# Patient Record
Sex: FEMALE | Race: WHITE | NOT HISPANIC OR LATINO | Employment: OTHER | URBAN - METROPOLITAN AREA
[De-identification: names, ages, dates, MRNs, and addresses within clinical notes are randomized per-mention and may not be internally consistent; named-entity substitution may affect disease eponyms.]

---

## 2023-08-03 ENCOUNTER — PATIENT MESSAGE (OUTPATIENT)
Dept: RESEARCH | Facility: HOSPITAL | Age: 73
End: 2023-08-03

## 2023-08-12 ENCOUNTER — OFFICE VISIT (OUTPATIENT)
Dept: URGENT CARE | Facility: CLINIC | Age: 73
End: 2023-08-12
Payer: MEDICARE

## 2023-08-12 VITALS
WEIGHT: 185 LBS | HEIGHT: 64 IN | HEART RATE: 70 BPM | RESPIRATION RATE: 18 BRPM | SYSTOLIC BLOOD PRESSURE: 136 MMHG | DIASTOLIC BLOOD PRESSURE: 60 MMHG | TEMPERATURE: 100 F | OXYGEN SATURATION: 95 % | BODY MASS INDEX: 31.58 KG/M2

## 2023-08-12 DIAGNOSIS — R05.9 COUGH WITH FEVER: Primary | ICD-10-CM

## 2023-08-12 DIAGNOSIS — U07.1 COVID-19 VIRUS DETECTED: ICD-10-CM

## 2023-08-12 DIAGNOSIS — R50.9 COUGH WITH FEVER: Primary | ICD-10-CM

## 2023-08-12 DIAGNOSIS — U07.1 SARS-COV-2 POSITIVE: ICD-10-CM

## 2023-08-12 LAB
CTP QC/QA: YES
SARS-COV-2 AG RESP QL IA.RAPID: POSITIVE

## 2023-08-12 PROCEDURE — 99204 OFFICE O/P NEW MOD 45 MIN: CPT | Mod: S$GLB,,, | Performed by: FAMILY MEDICINE

## 2023-08-12 PROCEDURE — 87811 SARS-COV-2 COVID19 W/OPTIC: CPT | Mod: QW,S$GLB,, | Performed by: FAMILY MEDICINE

## 2023-08-12 PROCEDURE — 99204 PR OFFICE/OUTPT VISIT, NEW, LEVL IV, 45-59 MIN: ICD-10-PCS | Mod: S$GLB,,, | Performed by: FAMILY MEDICINE

## 2023-08-12 PROCEDURE — 87811 SARS CORONAVIRUS 2 ANTIGEN POCT, MANUAL READ: ICD-10-PCS | Mod: QW,S$GLB,, | Performed by: FAMILY MEDICINE

## 2023-08-12 RX ORDER — NIRMATRELVIR AND RITONAVIR 300-100 MG
KIT ORAL
Qty: 30 TABLET | Refills: 0 | Status: SHIPPED | OUTPATIENT
Start: 2023-08-12

## 2023-08-12 NOTE — PATIENT INSTRUCTIONS
You tested positive for covid. Please isolate yourself for 5 days, today is day 0  Rx paxlovid for 5 days. Take with food. Please hold your cholesterol medicine until you finish paxlovid  Rest, hydration, over the counter mucinex or robitussin for cough, tylenol or ibuprofen for fever and discomfort, as needed  Red flag signs and indication to escalation to ER visit discussed  Follow up with PCP for further concerns

## 2023-08-12 NOTE — PROGRESS NOTES
"Subjective:      Patient ID: Ena Haywood is a 72 y.o. female.    Vitals:  height is 5' 4" (1.626 m) and weight is 83.9 kg (185 lb). Her temperature is 99.8 °F (37.7 °C). Her blood pressure is 136/60 and her pulse is 70. Her respiration is 18 and oxygen saturation is 95%.     Chief Complaint: Cough    C/o cough, yellowish sputum, associated fever, x 5 days, Pt has taken OTC meds with no relief.   Had covid early 2020, was barely sick.    Cough  This is a new problem. The current episode started in the past 7 days. The problem has been unchanged. The problem occurs constantly. The cough is Productive of sputum. Associated symptoms include a fever, nasal congestion and rhinorrhea. Pertinent negatives include no chest pain, chills, ear congestion, ear pain, headaches, heartburn, hemoptysis, myalgias, postnasal drip, rash, sore throat, shortness of breath, sweats, weight loss or wheezing. Nothing aggravates the symptoms. She has tried OTC cough suppressant for the symptoms. The treatment provided no relief. There is no history of asthma, bronchiectasis, bronchitis, COPD, emphysema, environmental allergies or pneumonia.       Constitution: Positive for fever. Negative for chills.   HENT:  Negative for ear pain, postnasal drip and sore throat.    Cardiovascular:  Negative for chest pain.   Respiratory:  Positive for cough. Negative for bloody sputum, shortness of breath and wheezing.    Gastrointestinal:  Negative for heartburn.   Musculoskeletal:  Negative for muscle ache.   Skin:  Negative for rash.   Allergic/Immunologic: Negative for environmental allergies.   Neurological:  Negative for headaches.      Objective:     Physical Exam   Constitutional: She is oriented to person, place, and time.  Non-toxic appearance. She appears ill. No distress.   Eyes: Conjunctivae are normal.   Cardiovascular: Normal rate and regular rhythm.   Pulmonary/Chest: Effort normal and breath sounds normal. She has no wheezes. She has no " rhonchi. She has no rales. She exhibits no tenderness.         Comments: SO2 95% noted    Abdominal: Normal appearance.   Lymphadenopathy:     She has no cervical adenopathy.   Neurological: no focal deficit. She is alert and oriented to person, place, and time.   Skin: Capillary refill takes less than 2 seconds.   Nursing note and vitals reviewed.    Assessment:     1. Cough with fever    2. SARS-CoV-2 positive      Results for orders placed or performed in visit on 08/12/23   SARS Coronavirus 2 Antigen, POCT Manual Read   Result Value Ref Range    SARS Coronavirus 2 Antigen Positive (A) Negative     Acceptable Yes       Plan:       Cough with fever  -     SARS Coronavirus 2 Antigen, POCT Manual Read  -     nirmatrelvir-ritonavir (PAXLOVID) 300 mg (150 mg x 2)-100 mg copackaged tablets (EUA); Take 3 tablets by mouth 2 (two) times daily. Each dose contains 2 nirmatrelvir (pink tablets) and 1 ritonavir (white tablet). Take all 3 tablets together  Dispense: 30 tablet; Refill: 0    SARS-CoV-2 positive  -     nirmatrelvir-ritonavir (PAXLOVID) 300 mg (150 mg x 2)-100 mg copackaged tablets (EUA); Take 3 tablets by mouth 2 (two) times daily. Each dose contains 2 nirmatrelvir (pink tablets) and 1 ritonavir (white tablet). Take all 3 tablets together  Dispense: 30 tablet; Refill: 0      You tested positive for covid. Please isolate yourself for 5 days, today is day 0  Rx paxlovid for 5 days. Please hold your cholesterol medicine until you finish paxlovid  Rest, hydration, over the counter mucinex or robitussin for cough, tylenol or ibuprofen for fever and discomfort, as needed  Red flag signs and indication to escalate to ER visit discussed  Follow up with PCP for further concerns

## 2024-12-30 ENCOUNTER — OFFICE VISIT (OUTPATIENT)
Dept: URGENT CARE | Facility: CLINIC | Age: 74
End: 2024-12-30
Payer: MEDICARE

## 2024-12-30 ENCOUNTER — HOSPITAL ENCOUNTER (OUTPATIENT)
Dept: RADIOLOGY | Facility: CLINIC | Age: 74
Discharge: HOME | End: 2024-12-30
Attending: PHYSICIAN ASSISTANT
Payer: MEDICARE

## 2024-12-30 VITALS
SYSTOLIC BLOOD PRESSURE: 112 MMHG | HEART RATE: 61 BPM | WEIGHT: 180.44 LBS | BODY MASS INDEX: 30.81 KG/M2 | OXYGEN SATURATION: 94 % | DIASTOLIC BLOOD PRESSURE: 54 MMHG | TEMPERATURE: 99 F | RESPIRATION RATE: 20 BRPM | HEIGHT: 64 IN

## 2024-12-30 DIAGNOSIS — N39.3 STRESS INCONTINENCE: ICD-10-CM

## 2024-12-30 DIAGNOSIS — J20.9 ACUTE BRONCHITIS, UNSPECIFIED ORGANISM: ICD-10-CM

## 2024-12-30 DIAGNOSIS — R82.90 ABNORMAL URINALYSIS: ICD-10-CM

## 2024-12-30 DIAGNOSIS — J98.8 BACTERIAL RESPIRATORY INFECTION: Primary | ICD-10-CM

## 2024-12-30 DIAGNOSIS — R05.8 PRODUCTIVE COUGH: ICD-10-CM

## 2024-12-30 DIAGNOSIS — B96.89 BACTERIAL RESPIRATORY INFECTION: Primary | ICD-10-CM

## 2024-12-30 PROBLEM — E78.2 MIXED HYPERLIPIDEMIA: Chronic | Status: ACTIVE | Noted: 2024-12-30

## 2024-12-30 PROBLEM — E03.8 SUBCLINICAL HYPOTHYROIDISM: Status: ACTIVE | Noted: 2017-05-15

## 2024-12-30 PROBLEM — E66.09 NON MORBID OBESITY DUE TO EXCESS CALORIES: Status: ACTIVE | Noted: 2017-01-10

## 2024-12-30 PROBLEM — I60.9 INTRACRANIAL SUBARACHNOID HEMORRHAGE: Status: ACTIVE | Noted: 2024-02-10

## 2024-12-30 PROBLEM — I05.9 MITRAL VALVE DISORDER: Status: ACTIVE | Noted: 2019-04-08

## 2024-12-30 PROBLEM — R73.01 IMPAIRED FASTING GLUCOSE: Status: ACTIVE | Noted: 2024-12-30

## 2024-12-30 PROBLEM — G47.00 FREQUENT NOCTURNAL AWAKENING: Status: ACTIVE | Noted: 2023-10-13

## 2024-12-30 PROBLEM — R06.83 SNORING: Status: ACTIVE | Noted: 2023-10-13

## 2024-12-30 PROBLEM — M19.90 OSTEOARTHRITIS: Chronic | Status: ACTIVE | Noted: 2017-08-30

## 2024-12-30 LAB
BILIRUBIN, UA POC OHS: NEGATIVE
BLOOD, UA POC OHS: ABNORMAL
CLARITY, UA POC OHS: CLEAR
COLOR, UA POC OHS: YELLOW
GLUCOSE, UA POC OHS: NEGATIVE
KETONES, UA POC OHS: NEGATIVE
LEUKOCYTES, UA POC OHS: ABNORMAL
NITRITE, UA POC OHS: NEGATIVE
PH, UA POC OHS: 5.5
PROTEIN, UA POC OHS: NEGATIVE
SPECIFIC GRAVITY, UA POC OHS: 1.02
UROBILINOGEN, UA POC OHS: 0.2

## 2024-12-30 PROCEDURE — 71046 X-RAY EXAM CHEST 2 VIEWS: CPT | Mod: S$GLB,,, | Performed by: RADIOLOGY

## 2024-12-30 PROCEDURE — 99214 OFFICE O/P EST MOD 30 MIN: CPT | Mod: S$GLB,,, | Performed by: PHYSICIAN ASSISTANT

## 2024-12-30 PROCEDURE — 81003 URINALYSIS AUTO W/O SCOPE: CPT | Mod: QW,S$GLB,, | Performed by: PHYSICIAN ASSISTANT

## 2024-12-30 PROCEDURE — 87186 SC STD MICRODIL/AGAR DIL: CPT | Performed by: PHYSICIAN ASSISTANT

## 2024-12-30 PROCEDURE — 87086 URINE CULTURE/COLONY COUNT: CPT | Performed by: PHYSICIAN ASSISTANT

## 2024-12-30 PROCEDURE — 87088 URINE BACTERIA CULTURE: CPT | Performed by: PHYSICIAN ASSISTANT

## 2024-12-30 RX ORDER — AMOXICILLIN AND CLAVULANATE POTASSIUM 875; 125 MG/1; MG/1
1 TABLET, FILM COATED ORAL 2 TIMES DAILY
Qty: 14 TABLET | Refills: 0 | Status: SHIPPED | OUTPATIENT
Start: 2024-12-30 | End: 2025-01-06

## 2024-12-30 RX ORDER — BENZONATATE 100 MG/1
100 CAPSULE ORAL 3 TIMES DAILY PRN
Qty: 30 CAPSULE | Refills: 0 | Status: SHIPPED | OUTPATIENT
Start: 2024-12-30 | End: 2025-01-09

## 2024-12-30 RX ORDER — PROMETHAZINE HYDROCHLORIDE AND DEXTROMETHORPHAN HYDROBROMIDE 6.25; 15 MG/5ML; MG/5ML
5 SYRUP ORAL EVERY 6 HOURS PRN
Qty: 120 ML | Refills: 0 | Status: SHIPPED | OUTPATIENT
Start: 2024-12-30

## 2024-12-30 NOTE — PROGRESS NOTES
"Subjective:      Patient ID: nEa Haywood is a 74 y.o. female.    Vitals:  height is 5' 4.09" (1.628 m) and weight is 81.8 kg (180 lb 7.1 oz). Her oral temperature is 98.9 °F (37.2 °C). Her blood pressure is 112/54 (abnormal) and her pulse is 61. Her respiration is 20 and oxygen saturation is 94% (abnormal).     Chief Complaint: Cough    Pt presenting with a wet cough and sinus drip that began a week ago. Pt describes the mucus as yellow colored and it was darker when it first started. Pt states it worsens when laying down and better when sitting up.  Cough is affecting her sleep. States she did have some soreness in bilateral ribs initially but this has gotten better. Pt tried OTC cough syrup with mild relief but caused her diarrhea (pt cannot remember name of OTC medication she took). Pt also wants to get her urine checked because she is urinating when she coughs. Having to wear pads. Only occurs while coughing. Denies hx of urological condition or recurrent UTI. Denies dysuria, hematuria, flank pain, malodorous urine. Denies fever/chills, wheezing/sob, chest pain, congestion, sore throat.     Cough  This is a new problem. The current episode started in the past 7 days. The problem has been unchanged. The problem occurs constantly. The cough is Productive of sputum. Associated symptoms include postnasal drip. Pertinent negatives include no chest pain, chills, ear congestion, ear pain, fever, headaches, hemoptysis, myalgias, nasal congestion, rash, rhinorrhea, sore throat, shortness of breath, sweats or wheezing. Associated symptoms comments: cough. The symptoms are aggravated by lying down. She has tried OTC cough suppressant for the symptoms. The treatment provided mild relief. There is no history of asthma, bronchitis, COPD or pneumonia.       Constitution: Negative for chills, sweating, fatigue and fever.   HENT:  Positive for postnasal drip. Negative for ear pain, congestion, sinus pain and sore throat.  "   Cardiovascular:  Negative for chest pain and leg swelling.   Respiratory:  Positive for cough and sputum production. Negative for bloody sputum, shortness of breath, wheezing and asthma.    Gastrointestinal: Negative.    Genitourinary:  Positive for bladder incontinence. Negative for dysuria, frequency, flank pain, hematuria and pelvic pain.   Musculoskeletal:  Negative for muscle ache.   Skin:  Negative for rash.   Allergic/Immunologic: Negative for asthma.   Neurological:  Negative for headaches.      Objective:     Physical Exam   Constitutional: She appears well-developed.  Non-toxic appearance. She does not appear ill. No distress.   HENT:   Head: Normocephalic and atraumatic.   Ears:   Right Ear: Tympanic membrane, external ear and ear canal normal.   Left Ear: Tympanic membrane, external ear and ear canal normal.   Nose: Nose normal.   Eyes: Conjunctivae and EOM are normal.   Neck: Neck supple.   Cardiovascular: Normal rate, regular rhythm and normal heart sounds.   Pulmonary/Chest: Effort normal. No respiratory distress. She has decreased breath sounds (slightly decreased in right lower lobe). She has no wheezes. She has no rhonchi.         Comments: Intermittent wet cough, worse with deep breathing    Abdominal: Normal appearance.   Musculoskeletal: Normal range of motion.         General: Normal range of motion.   Neurological: no focal deficit. She is alert. She displays no weakness. Gait normal.   Skin: Skin is warm, dry, not diaphoretic, not pale and no rash.   Psychiatric: Her behavior is normal.     Results for orders placed or performed in visit on 12/30/24   POCT Urinalysis(Instrument)    Collection Time: 12/30/24 12:10 PM   Result Value Ref Range    Color, POC UA Yellow Yellow, Straw, Colorless    Clarity, POC UA Clear Clear    Glucose, POC UA Negative Negative    Bilirubin, POC UA Negative Negative    Ketones, POC UA Negative Negative    Spec Grav POC UA 1.020 1.005 - 1.030    Blood, POC UA  Trace-lysed (A) Negative    pH, POC UA 5.5 5.0 - 8.0    Protein, POC UA Negative Negative    Urobilinogen, POC UA 0.2 <=1.0    Nitrite, POC UA Negative Negative    WBC, POC UA Small (A) Negative     XR CHEST PA AND LATERAL    Result Date: 12/30/2024  EXAM:  XR CHEST PA AND LATERAL CLINICAL INDICATION: Other specified cough. COMPARISON STUDY:  None. FINDINGS: Normal size heart.  Lungs are clear.      Normal chest x-ray. Finalized on: 12/30/2024 12:32 PM By:  Gabino Acosta MD [facility]# [id number]      2024-12-30 12:34:[id number]    [facility]     Assessment:     1. Bacterial respiratory infection    2. Stress incontinence    3. Productive cough    4. Abnormal urinalysis    5. Acute bronchitis, unspecified organism        Plan:       Bacterial respiratory infection  -     amoxicillin-clavulanate 875-125mg (AUGMENTIN) 875-125 mg per tablet; Take 1 tablet by mouth 2 (two) times daily. for 7 days  Dispense: 14 tablet; Refill: 0    Stress incontinence  -     POCT Urinalysis(Instrument)  -     CULTURE, URINE    Productive cough  -     XR CHEST PA AND LATERAL; Future; Expected date: 12/30/2024  -     benzonatate (TESSALON) 100 MG capsule; Take 1 capsule (100 mg total) by mouth 3 (three) times daily as needed for Cough.  Dispense: 30 capsule; Refill: 0  -     promethazine-dextromethorphan (PROMETHAZINE-DM) 6.25-15 mg/5 mL Syrp; Take 5 mLs by mouth every 6 (six) hours as needed (cough). May cause drowsiness.  Dispense: 120 mL; Refill: 0    Abnormal urinalysis  -     CULTURE, URINE    Acute bronchitis, unspecified organism  -     benzonatate (TESSALON) 100 MG capsule; Take 1 capsule (100 mg total) by mouth 3 (three) times daily as needed for Cough.  Dispense: 30 capsule; Refill: 0  -     promethazine-dextromethorphan (PROMETHAZINE-DM) 6.25-15 mg/5 mL Syrp; Take 5 mLs by mouth every 6 (six) hours as needed (cough). May cause drowsiness.  Dispense: 120 mL; Refill: 0          Medical Decision Making:   Clinical Tests:   Lab Tests: Ordered and  Reviewed       <> Summary of Lab: UA: trace blood and WBC  Radiological Study: Ordered and Reviewed  Urgent Care Management:  Will send urine culture to r/o UTI given abnormal UA results. CXR wnl. Will cover for bacterial respiratory infection with Augmentin. Discussed using promethazine dm vs tessalon perles prn for cough. Advised promethazine can cause drowsiness. Close f/u with PCP or rtc if any new or worsening symptoms, or if symptoms do not improve with treatment.

## 2025-01-02 ENCOUNTER — TELEPHONE (OUTPATIENT)
Dept: URGENT CARE | Facility: CLINIC | Age: 75
End: 2025-01-02
Payer: MEDICARE

## 2025-01-02 LAB — BACTERIA UR CULT: ABNORMAL

## 2025-01-02 NOTE — TELEPHONE ENCOUNTER
Voicemail left for Patient to return call to office to discuss urine culture results and to see how she is doing. No resistance seen. She is on Augmentin currently.     Skye Bynum MD  01/02/2025  808 am

## 2025-01-07 ENCOUNTER — HOSPITAL ENCOUNTER (OUTPATIENT)
Dept: RADIOLOGY | Facility: HOSPITAL | Age: 75
Discharge: HOME | End: 2025-01-07
Attending: NURSE PRACTITIONER
Payer: MEDICARE

## 2025-01-07 ENCOUNTER — TELEPHONE (OUTPATIENT)
Dept: URGENT CARE | Facility: CLINIC | Age: 75
End: 2025-01-07

## 2025-01-07 ENCOUNTER — OFFICE VISIT (OUTPATIENT)
Dept: URGENT CARE | Facility: CLINIC | Age: 75
End: 2025-01-07
Payer: MEDICARE

## 2025-01-07 VITALS
DIASTOLIC BLOOD PRESSURE: 56 MMHG | RESPIRATION RATE: 18 BRPM | OXYGEN SATURATION: 96 % | TEMPERATURE: 97 F | HEIGHT: 64 IN | HEART RATE: 61 BPM | BODY MASS INDEX: 31.18 KG/M2 | SYSTOLIC BLOOD PRESSURE: 111 MMHG | WEIGHT: 182.63 LBS

## 2025-01-07 DIAGNOSIS — S09.90XA HEAD TRAUMA, INITIAL ENCOUNTER: ICD-10-CM

## 2025-01-07 DIAGNOSIS — S01.01XA LACERATION OF SCALP WITHOUT FOREIGN BODY, INITIAL ENCOUNTER: ICD-10-CM
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
## 2025-01-07 DIAGNOSIS — S09.90XA TRAUMATIC INJURY OF HEAD, INITIAL ENCOUNTER: ICD-10-CM
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
## 2025-01-07 PROCEDURE — 70450 CT HEAD/BRAIN W/O DYE: CPT | Mod: 26,,, | Performed by: RADIOLOGY

## 2025-01-07 PROCEDURE — 70450 CT HEAD/BRAIN W/O DYE: CPT | Mod: TC,PN

## 2025-01-07 PROCEDURE — 99499 UNLISTED E&M SERVICE: CPT | Mod: S$GLB,,, | Performed by: NURSE PRACTITIONER

## 2025-01-07 PROCEDURE — 12002 RPR S/N/AX/GEN/TRNK2.6-7.5CM: CPT | Mod: S$GLB,,, | Performed by: NURSE PRACTITIONER

## 2025-01-07 RX ORDER — ACETAMINOPHEN 325 MG/1
650 TABLET ORAL
Status: COMPLETED | OUTPATIENT
Start: 2025-01-07 | End: 2025-01-07

## 2025-01-07 RX ORDER — MUPIROCIN 20 MG/G
OINTMENT TOPICAL DAILY
Qty: 1 G | Refills: 0 | Status: SHIPPED | OUTPATIENT
Start: 2025-01-07 | End: 2025-01-10

## 2025-01-07 RX ORDER — MUPIROCIN 20 MG/G
OINTMENT TOPICAL
Status: COMPLETED | OUTPATIENT
Start: 2025-01-07 | End: 2025-01-07

## 2025-01-07 RX ADMIN — ACETAMINOPHEN 650 MG: 325 TABLET ORAL at 11:01

## 2025-01-07 RX ADMIN — MUPIROCIN 1 TUBE: 20 OINTMENT TOPICAL at 11:01

## 2025-01-07 NOTE — PATIENT INSTRUCTIONS
How can you care for yourself at home?  Keep the cut dry for the first 24  hours. After this, you may clean with antibacterial soap and pat dry. Keep area clean and dry.  Avoid over saturating the affected area. Apply a thin layer of antibiotic ointment after cleaning for the next three days.   Avoid any activity that could cause your cut to reopen.  Do not remove the staples on your own. Follow up here in 7-10 days for removal of your staples.   You may tylenol as needed for pain.       When should you call for help?  Call your doctor or nurse advice line now or seek immediate medical care if:  You have new pain, or your pain gets worse.  The skin near the cut is cold or pale or changes colour.  You have tingling, weakness, or numbness near the cut.  The cut starts to bleed, and blood soaks through the bandage. Oozing small amounts of blood is normal.  You have trouble moving the area near the cut.  You have symptoms of infection, such as:  Increased pain, swelling, warmth, or redness around the cut.  Red streaks leading from the cut.  Pus draining from the cut.  A fever.  Watch closely for changes in your health, and be sure to contact your doctor or nurse advice line if:  You do not get better as expected.    Follow up with your PCP for re-evaluation within the next 3-5 days follow up sooner if symptoms worsen or persist.   You have a pending CT scan, We will call and notify you of these results.      Please arrange follow up with your primary medical clinic as soon as possible. You must understand that you've received an Urgent Care treatment only and that you may be released before all of your medical problems are known or treated. You, the patient, will arrange for follow up as instructed. If your symptoms worsen or fail to improve you should go to the Emergency Room.

## 2025-01-07 NOTE — PROCEDURES
Laceration Repair    Date/Time: 1/7/2025 10:15 AM    Performed by: Yuki Santillan NP  Authorized by: Yuki Santillan NP  Body area: head/neck  Location details: scalp  Laceration length: 4 cm  Foreign bodies: no foreign bodies  Vascular damage: no  Anesthesia: local infiltration    Anesthesia:  Local Anesthetic: lidocaine 1% without epinephrine  Anesthetic total: 2 mL    Patient sedated: no  Irrigation method: syringe  Amount of cleaning: standard  Debridement: none  Skin closure: staples  Number of sutures: 2 staples.  Approximation: loose  Approximation difficulty: simple  Dressing: antibiotic ointment  Patient tolerance: Patient tolerated the procedure well with no immediate complications

## 2025-01-07 NOTE — PROGRESS NOTES
"Subjective:      Patient ID: Ena Haywood is a 74 y.o. female.    Vitals:  height is 5' 4" (1.626 m) and weight is 82.9 kg (182 lb 10.4 oz). Her tympanic temperature is 97 °F (36.1 °C). Her blood pressure is 111/56 (abnormal) and her pulse is 61. Her respiration is 18 and oxygen saturation is 96%.     Chief Complaint: Head Injury    Ena Haywood is a 74 year old female whom presents to urgent care for evaluation of  a head injury that occurred yesterday around 4:30 pm. Patient reports She fell and hit her head on yesterday while trying to care a heavy plantar inside. She reports she lost her footing resulting in her hitting her head on  a big planter. She has a laceration/abrasion to her scalp. She states that it bled for awhile and she put a cold compress on it and a salve cream. Patient reports the bleeding has stopped. She denies lost of consciousness.  Patient denies  chest pain, Blurred vision, Dizziness, shortness of breath, funny heart beats, weakness in one arm or leg, slurred speech, numbness, inability to walk or talk, confusion.       Head Injury   The incident occurred 12 to 24 hours ago. The injury mechanism was a direct blow and a fall. There was no loss of consciousness. The volume of blood lost was moderate. The pain is at a severity of 2/10. The patient is experiencing no pain. Pertinent negatives include no blurred vision, disorientation, headaches, memory loss, numbness, tinnitus, vomiting or weakness. She has tried ice for the symptoms. The treatment provided no relief.       HENT:  Negative for tinnitus.    Eyes:  Negative for blurred vision.   Gastrointestinal:  Negative for vomiting.   Neurological:  Negative for headaches, disorientation and numbness.   Psychiatric/Behavioral:  Negative for disorientation.       Objective:     Physical Exam   Constitutional: She is oriented to person, place, and time. She appears well-developed. She is cooperative.   HENT:   Head: Normocephalic and " atraumatic.       Ears:   Right Ear: Hearing, tympanic membrane, external ear and ear canal normal.   Left Ear: Hearing, tympanic membrane, external ear and ear canal normal.   Nose: Nose normal. No mucosal edema or nasal deformity. No epistaxis. Right sinus exhibits no maxillary sinus tenderness and no frontal sinus tenderness. Left sinus exhibits no maxillary sinus tenderness and no frontal sinus tenderness.   Mouth/Throat: Uvula is midline, oropharynx is clear and moist and mucous membranes are normal. No trismus in the jaw. Normal dentition. No uvula swelling.   Eyes: Conjunctivae and lids are normal.   Neck: Trachea normal and phonation normal. Neck supple.   Cardiovascular: Normal rate, regular rhythm, normal heart sounds and normal pulses.   Pulmonary/Chest: Effort normal and breath sounds normal.   Abdominal: Normal appearance and bowel sounds are normal. Soft.   Musculoskeletal: Normal range of motion.         General: Normal range of motion.   Neurological: She is alert and oriented to person, place, and time. She exhibits normal muscle tone.   Skin: Skin is warm, dry and intact.   Psychiatric: Her speech is normal and behavior is normal. Judgment and thought content normal.   Nursing note and vitals reviewed.    CT Head Without Contrast    Result Date: 1/7/2025  EXAMINATION: CT HEAD WITHOUT CONTRAST CLINICAL HISTORY: Head trauma, minor (Age >= 65y); Unspecified fall, initial encounter TECHNIQUE: Low dose axial CT images obtained throughout the head without intravenous contrast. Sagittal and coronal reconstructions were performed.  All CT scans at this facility use dose modulation, iterative reconstruction and/or weight based dosing when appropriate to reduce radiation dose to as low as reasonably achievable. COMPARISON: None. FINDINGS: Intracranial compartment: The brain parenchyma demonstrates areas of decreased attenuation with mild to moderate periventricular white matter consistent with chronic  microvascular ischemic changes..  No parenchymal mass, hemorrhage, edema or major vascular distribution infarct.  Vascular calcifications are noted. Mild prominence of the sulci and ventricles are consistent with age-related involutional changes. No extra-axial blood or fluid collections. Skull/extracranial contents (limited evaluation): No fracture. Mastoid air cells and paranasal sinuses are essentially clear.  Scalp skin staples are noted.     Chronic microvascular ischemic changes. Electronically signed by: Sagar Tapia MD Date:    01/07/2025 Time:    12:50    XR CHEST PA AND LATERAL    Result Date: 12/30/2024  EXAM:  XR CHEST PA AND LATERAL CLINICAL INDICATION: Other specified cough. COMPARISON STUDY:  None. FINDINGS: Normal size heart.  Lungs are clear.      Normal chest x-ray. Finalized on: 12/30/2024 12:32 PM By:  Gabino Acosta MD [facility]# [id number]      2024-12-30 12:34:34.733    [facility]     Assessment:     1. Fall, initial encounter    2. Laceration of scalp without foreign body, initial encounter    3. Traumatic injury of head, initial encounter    4. Head trauma, initial encounter        Plan:       Fall, initial encounter  -     CT Head Without Contrast; Future; Expected date: 01/07/2025  -     mupirocin 2 % ointment  -     acetaminophen tablet 650 mg    Laceration of scalp without foreign body, initial encounter  -     CT Head Without Contrast; Future; Expected date: 01/07/2025  -     mupirocin 2 % ointment  -     acetaminophen tablet 650 mg  -     mupirocin (BACTROBAN) 2 % ointment; Apply topically once daily. for 3 days  Dispense: 1 g; Refill: 0  -     Laceration Repair    Traumatic injury of head, initial encounter  -     CT Head Without Contrast; Future; Expected date: 01/07/2025  -     mupirocin 2 % ointment    Head trauma, initial encounter  -     CT Head Without Contrast; Future; Expected date: 01/07/2025  -     acetaminophen tablet 650 mg          Medical Decision Making:   Urgent Care  Management:  Laceration repaired with 2 staples, patient tolerated well. Discussed wound care instructions with patient. CT of head ordered given patients history of subarachnoid hemorrhage due to a fall in the past. Tylenol as needed for pain. Patient was given strict ED and follow up precautions. Treatment plan as well as options and alternatives reviewed and discussed with patient. All of the patients questions and concerns were addressed.The patient verbalized understanding and agrees with the discussed plan of care. Patient remained stable and was discharged in no acute distress.                 Patient Instructions   How can you care for yourself at home?  Keep the cut dry for the first 24  hours. After this, you may clean with antibacterial soap and pat dry. Keep area clean and dry.  Avoid over saturating the affected area. Apply a thin layer of antibiotic ointment after cleaning for the next three days.   Avoid any activity that could cause your cut to reopen.  Do not remove the staples on your own. Follow up here in 7-10 days for removal of your staples.   You may tylenol as needed for pain.       When should you call for help?  Call your doctor or nurse advice line now or seek immediate medical care if:  You have new pain, or your pain gets worse.  The skin near the cut is cold or pale or changes colour.  You have tingling, weakness, or numbness near the cut.  The cut starts to bleed, and blood soaks through the bandage. Oozing small amounts of blood is normal.  You have trouble moving the area near the cut.  You have symptoms of infection, such as:  Increased pain, swelling, warmth, or redness around the cut.  Red streaks leading from the cut.  Pus draining from the cut.  A fever.  Watch closely for changes in your health, and be sure to contact your doctor or nurse advice line if:  You do not get better as expected.    Follow up with your PCP for re-evaluation within the next 3-5 days follow up sooner if  symptoms worsen or persist.   You have a pending CT scan, We will call and notify you of these results.      Please arrange follow up with your primary medical clinic as soon as possible. You must understand that you've received an Urgent Care treatment only and that you may be released before all of your medical problems are known or treated. You, the patient, will arrange for follow up as instructed. If your symptoms worsen or fail to improve you should go to the Emergency Room.